# Patient Record
Sex: MALE | Race: WHITE | URBAN - METROPOLITAN AREA
[De-identification: names, ages, dates, MRNs, and addresses within clinical notes are randomized per-mention and may not be internally consistent; named-entity substitution may affect disease eponyms.]

---
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
## 2024-11-22 ENCOUNTER — APPOINTMENT (OUTPATIENT)
Dept: GENERAL RADIOLOGY | Age: 24
End: 2024-11-22
Payer: COMMERCIAL

## 2024-11-22 ENCOUNTER — HOSPITAL ENCOUNTER (EMERGENCY)
Age: 24
Discharge: HOME | End: 2024-11-22
Attending: EMERGENCY MEDICINE
Payer: COMMERCIAL

## 2024-11-22 VITALS
HEIGHT: 68 IN | RESPIRATION RATE: 18 BRPM | SYSTOLIC BLOOD PRESSURE: 144 MMHG | OXYGEN SATURATION: 99 % | WEIGHT: 199 LBS | TEMPERATURE: 98.7 F | DIASTOLIC BLOOD PRESSURE: 78 MMHG | HEART RATE: 56 BPM | BODY MASS INDEX: 30.16 KG/M2

## 2024-11-22 DIAGNOSIS — S62.636B OPEN DISPLACED FRACTURE OF DISTAL PHALANX OF RIGHT LITTLE FINGER, INITIAL ENCOUNTER: Primary | ICD-10-CM

## 2024-11-22 PROCEDURE — 6370000000 HC RX 637 (ALT 250 FOR IP)

## 2024-11-22 PROCEDURE — 90471 IMMUNIZATION ADMIN: CPT

## 2024-11-22 PROCEDURE — 6360000002 HC RX W HCPCS

## 2024-11-22 PROCEDURE — 73130 X-RAY EXAM OF HAND: CPT

## 2024-11-22 PROCEDURE — 99284 EMERGENCY DEPT VISIT MOD MDM: CPT

## 2024-11-22 PROCEDURE — 90715 TDAP VACCINE 7 YRS/> IM: CPT

## 2024-11-22 PROCEDURE — 12001 RPR S/N/AX/GEN/TRNK 2.5CM/<: CPT

## 2024-11-22 RX ORDER — LIDOCAINE HYDROCHLORIDE AND EPINEPHRINE 10; 10 MG/ML; UG/ML
20 INJECTION, SOLUTION INFILTRATION; PERINEURAL ONCE
Status: COMPLETED | OUTPATIENT
Start: 2024-11-22 | End: 2024-11-22

## 2024-11-22 RX ORDER — CEPHALEXIN 500 MG/1
500 CAPSULE ORAL 3 TIMES DAILY
Qty: 21 CAPSULE | Refills: 0 | Status: SHIPPED | OUTPATIENT
Start: 2024-11-22 | End: 2024-11-29

## 2024-11-22 RX ORDER — CEPHALEXIN 500 MG/1
500 CAPSULE ORAL ONCE
Status: COMPLETED | OUTPATIENT
Start: 2024-11-22 | End: 2024-11-22

## 2024-11-22 RX ADMIN — LIDOCAINE HYDROCHLORIDE,EPINEPHRINE BITARTRATE 20 ML: 10; .01 INJECTION, SOLUTION INFILTRATION; PERINEURAL at 17:24

## 2024-11-22 RX ADMIN — CEPHALEXIN 500 MG: 500 CAPSULE ORAL at 19:01

## 2024-11-22 RX ADMIN — TETANUS TOXOID, REDUCED DIPHTHERIA TOXOID AND ACELLULAR PERTUSSIS VACCINE, ADSORBED 0.5 ML: 5; 2.5; 8; 8; 2.5 SUSPENSION INTRAMUSCULAR at 17:26

## 2024-11-22 ASSESSMENT — PAIN DESCRIPTION - LOCATION: LOCATION: FINGER (COMMENT WHICH ONE)

## 2024-11-22 ASSESSMENT — PAIN SCALES - GENERAL: PAINLEVEL_OUTOF10: 4

## 2024-11-22 ASSESSMENT — LIFESTYLE VARIABLES
HOW MANY STANDARD DRINKS CONTAINING ALCOHOL DO YOU HAVE ON A TYPICAL DAY: 1 OR 2
HOW OFTEN DO YOU HAVE A DRINK CONTAINING ALCOHOL: 2-4 TIMES A MONTH

## 2024-11-22 ASSESSMENT — PAIN - FUNCTIONAL ASSESSMENT: PAIN_FUNCTIONAL_ASSESSMENT: 0-10

## 2024-11-23 NOTE — ED PROVIDER NOTES
ED Attending Attestation Note     Date of evaluation: 11/22/2024    This patient was seen by the resident.  I have seen and examined the patient, agree with the workup, evaluation, management and diagnosis. The care plan has been discussed.      Briefly, Kings Baron is a 24 y.o. male with no significant PMHwho presents for evaluation of injury to right hand. He is RHD. Has a lac to right hand.     Notable exam findings include laceration to right small finger    Assessment/ Medical Decision Making:     X-ray with fracture with overlying laceration.  Discussed with orthopedic surgery who advised closure, splinting and follow-up in clinic.  Given antibiotics       Imelda Jovel MD  11/22/24 6989    
 Patient signed out to me pending lack repair and splinting of right fifth digit.  Briefly, this is a 24-year-old male who injured his finger and was found to have a comminuted fracture at the base of the fifth distal phalanx.  Possible open fracture is overlying laceration though laceration does not appear to be very deep.  Patient will be discharged home with Keflex.  Please see separate procedure documentation regarding laceration pair.  Was repaired with fast absorbing gut and also glue and splinted.  Recommended post splinting/reduction x-ray to assess for improved alignment and patient did decline as the joint had a lot of mobility and he anticipated that he will be going to work and using his hands and will likely not stay aligned.  He does understand that this can cause suboptimal healing of his joint.  He was given hand surgery follow-up in prescription for Keflex.  Instructed to return to the emergency department if he develops any purulent drainage from the wound, dehiscence of the wound, worsening edema or fevers.  He verbalized understanding for discussion and agrees with this plan.  All of his questions were answered     Mounika Singh MD  Resident  11/22/24 [id number]    
  Neurological:      General: No focal deficit present.      Mental Status: He is alert and oriented to person, place, and time. Mental status is at baseline.   Psychiatric:         Mood and Affect: Mood normal.         Behavior: Behavior normal.                Kim Mckeon MD  Resident  11/22/24 1924

## 2024-11-23 NOTE — PROCEDURES
PROCEDURE NOTE  Date: 11/22/2024   Name: Kings Baron  YOB: 2000    Lac Repair    Date/Time: 11/22/2024 11:53 PM    Performed by: Mounika Singh MD  Authorized by: Imelda Jovel MD    Consent:     Consent obtained:  Verbal    Consent given by:  Patient    Risks discussed:  Infection and pain    Alternatives discussed:  No treatment  Universal protocol:     Patient identity confirmed:  Verbally with patient  Anesthesia:     Anesthesia method:  Nerve block    Block location:  Digital volar block    Block anesthetic:  Lidocaine 1% WITH epi    Block outcome:  Anesthesia achieved  Laceration details:     Location:  Finger    Finger location:  R small finger    Length (cm):  1  Pre-procedure details:     Preparation:  Patient was prepped and draped in usual sterile fashion and imaging obtained to evaluate for foreign bodies  Treatment:     Amount of cleaning:  Standard    Irrigation solution:  Sterile saline  Skin repair:     Repair method:  Sutures and tissue adhesive    Suture size:  5-0    Suture material:  Fast-absorbing gut    Number of sutures:  5  Approximation:     Approximation:  Close  Repair type:     Repair type:  Simple  Post-procedure details:     Dressing:  Non-adherent dressing and splint for protection    Procedure completion:  Tolerated well, no immediate complications

## 2024-11-23 NOTE — DISCHARGE INSTRUCTIONS
You were seen in the emergency department after a finger injury.  You do have a fracture.  We have splinted it.  He also have stitches and glue.  Please avoid getting your hand wet.  The glue will fall off on its own.  We are giving antibiotics to help prevent an infection.  Please follow-up with hand surgery in 1 week.  Their phone number is attached.  Keep the splint covered while showering.

## 2024-11-25 ENCOUNTER — TELEPHONE (OUTPATIENT)
Dept: ORTHOPEDIC SURGERY | Age: 24
End: 2024-11-25

## 2024-11-25 NOTE — TELEPHONE ENCOUNTER
Did leave message regarding ED referral for an appointment. Upon return call please schedule with Dr. Pepe.

## 2024-11-26 NOTE — TELEPHONE ENCOUNTER
2nd attempt: Did leave message regarding ED referral for an appointment. Upon return call please schedule with Dr. Pepe.

## 2024-11-27 NOTE — TELEPHONE ENCOUNTER
3rd attempt: Did leave message regarding ED referral for an appointment. Upon return call please schedule with Dr. Pepe.

## 2025-07-21 ENCOUNTER — HOSPITAL ENCOUNTER (EMERGENCY)
Age: 25
Discharge: HOME | End: 2025-07-21
Attending: EMERGENCY MEDICINE
Payer: COMMERCIAL

## 2025-07-21 ENCOUNTER — APPOINTMENT (OUTPATIENT)
Dept: GENERAL RADIOLOGY | Age: 25
End: 2025-07-21
Payer: COMMERCIAL

## 2025-07-21 VITALS
HEIGHT: 67 IN | TEMPERATURE: 98.2 F | HEART RATE: 79 BPM | SYSTOLIC BLOOD PRESSURE: 177 MMHG | OXYGEN SATURATION: 100 % | DIASTOLIC BLOOD PRESSURE: 99 MMHG | WEIGHT: 180 LBS | BODY MASS INDEX: 28.25 KG/M2 | RESPIRATION RATE: 18 BRPM

## 2025-07-21 DIAGNOSIS — S61.219A LACERATION OF FINGER WITHOUT FOREIGN BODY WITHOUT DAMAGE TO NAIL, UNSPECIFIED FINGER, UNSPECIFIED LATERALITY, INITIAL ENCOUNTER: Primary | ICD-10-CM

## 2025-07-21 PROBLEM — S61.211A LACERATION OF LEFT INDEX FINGER WITHOUT FOREIGN BODY WITHOUT DAMAGE TO NAIL: Status: ACTIVE | Noted: 2025-07-21

## 2025-07-21 PROCEDURE — 99283 EMERGENCY DEPT VISIT LOW MDM: CPT

## 2025-07-21 PROCEDURE — 12042 INTMD RPR N-HF/GENIT2.6-7.5: CPT

## 2025-07-21 PROCEDURE — 6370000000 HC RX 637 (ALT 250 FOR IP)

## 2025-07-21 PROCEDURE — 6360000002 HC RX W HCPCS

## 2025-07-21 RX ORDER — BACITRACIN ZINC AND POLYMYXIN B SULFATE 500; 1000 [USP'U]/G; [USP'U]/G
OINTMENT TOPICAL ONCE
Status: COMPLETED | OUTPATIENT
Start: 2025-07-21 | End: 2025-07-21

## 2025-07-21 RX ORDER — LIDOCAINE HYDROCHLORIDE AND EPINEPHRINE 10; 10 MG/ML; UG/ML
20 INJECTION, SOLUTION INFILTRATION; PERINEURAL ONCE
Status: COMPLETED | OUTPATIENT
Start: 2025-07-21 | End: 2025-07-21

## 2025-07-21 RX ADMIN — LIDOCAINE HYDROCHLORIDE,EPINEPHRINE BITARTRATE 20 ML: 10; .01 INJECTION, SOLUTION INFILTRATION; PERINEURAL at 10:51

## 2025-07-21 RX ADMIN — Medication 1 G: at 11:19

## 2025-07-21 NOTE — DISCHARGE INSTRUCTIONS
You were seen in the ED today with a hand laceration.  Your laceration was repaired with sutures that will be absorbed on their own.  Please keep the area dry and clean at all times.  Please watch for signs of infection such as fevers, swelling, pus.  If you have any signs of infection return to the ED.  I have given you antibiotic to take for the next 7 days as well.  I was giving him some bacitracin to use over the area.  Please follow-up with hand surgery at your earliest convenience for other evaluation.

## 2025-07-21 NOTE — ED PROVIDER NOTES
ED Attending Attestation Note     Date of evaluation: 7/21/2025    This patient was seen by the resident.  I have seen and examined the patient, agree with the workup, evaluation, management and diagnosis. The care plan has been discussed.      Briefly, Kings Baron is a 24 y.o. male with no significant PMH who presents for evaluation of hand laceration at the base of the 2nd digit     Notable exam findings include laceration to the volar base of the 2nd digit    Assessment/ Medical Decision Making:     Tendons intact. I supervised laceration repair. Will discharge with follow up as needed.          Imelda Jovel MD  07/21/25 1114

## 2025-07-21 NOTE — CONSULTS
[facility] Orthopedic Surgery  Consult Note         This patient is seen in consultation at the request of Yessica Sawyer MD     Reason for Consult:  Left hand pain/ index finger proximal phalanx base volar laceration.    CHIEF COMPLAINT:  Left hand pain/ index finger proximal phalanx base volar laceration.    History Obtained From:  patient, electronic medical record    HISTORY OF PRESENT ILLNESS:    Mr. Baron 24 y.o.  male right handed seen today at [facility] ED for evaluation of a left hand injury which occurred when he accidentally cut his left hand with a knife. He is complaining of index finger pain and volar cut. This is better with elevation and worse with ROM. The pain is sharp and not radiating. No other complaint. He was seen at [facility] ED, was evaluated and I was consulted.    Past Medical History:        Diagnosis Date    Loose body in knee 10/7/2013       Past Surgical History:        Procedure Laterality Date    HAND SURGERY Right     KNEE ARTHROSCOPY Left     PATELLA SURGERY Right 5/1013    SHOULDER SURGERY Right 2015    labral tear       Medications prior to admission:   Prior to Admission medications    Not on File       Current Medications:   No current facility-administered medications for this encounter.    Allergies:  Patient has no known allergies.    Social History     Socioeconomic History    Marital status: Single     Spouse name: Not on file    Number of children: Not on file    Years of education: Not on file    Highest education level: Not on file   Occupational History    Not on file   Tobacco Use    Smoking status: Never    Smokeless tobacco: Never   Vaping Use    Vaping status: Unknown   Substance and Sexual Activity    Alcohol use: Not on file     Comment: occ.    Drug use: Yes     Types: Marijuana (Weed)    Sexual activity: Defer   Other Topics Concern    Not on file   Social History Narrative    Not on file     Social Drivers of Health     Financial Resource Strain: Not

## 2025-07-21 NOTE — ED PROVIDER NOTES
[facility]  EMERGENCY DEPARTMENT ENCOUNTER          EM RESIDENT NOTE       Date of evaluation: 7/21/2025    Chief Complaint     Laceration (Laceration noted to L hand, second digit. Bleeding controlled at this time. States he believes his last tetanus was this past November. )      History of Present Illness     Kings Baron is a 24 y.o. male who presents to the ED with laceration to the left index finger.  Patient was working when a razor cut him.  He presents to the ED for repair.  He had Tdap 1 year ago.  Denies any discomfort anywhere else.  He presents with an isolated index finger injury.    MEDICAL DECISION MAKING / ASSESSMENT / PLAN     INITIAL VITALS: BP: (!) 177/99, Temp: 98.2 °F (36.8 °C), Pulse: 79, Respirations: 18, SpO2: 100 %    Kings Baron is a 24 y.o. male coming to the ED with a laceration to the left index finger.  He is right-hand dominant.  Upon assessment the patient's PIP and DIP joint intact bilaterally.  He has strong palpable radial pulses.  He has a normal hand cascade.  Bleeding is controlled.  There is about a 3 cm laceration to the left index finger.  The area was irrigated with copious amounts of water.  Hand x-ray was offered but the patient declined saying that he does not think his bone was affected.  We discussed that the x-rays to evaluate for foreign objects.  Given the patient declined x-ray we did extensive irrigation and wound aspiration.  No visible foreign objects.  He was numbed and the area was repaired in a 2 layer fashion using 4-0 Monocryl.  Patient given follow-up to hand surgery in the event that he develops any concerns.  At this time I have no suspicion for vascular, nerve or tendon injury.  No indication for Tdap today.  He did not want any additional pain medications.    Hand surgery follow up given     Augmentin for 7 days prescribed    After repaid hand function continues to be intact. Low suspicion for fracture o other adverse effect from razor